# Patient Record
Sex: MALE | Race: WHITE
[De-identification: names, ages, dates, MRNs, and addresses within clinical notes are randomized per-mention and may not be internally consistent; named-entity substitution may affect disease eponyms.]

---

## 2019-09-23 ENCOUNTER — HOSPITAL ENCOUNTER (OUTPATIENT)
Dept: HOSPITAL 35 - CAT | Age: 64
End: 2019-09-23
Attending: FAMILY MEDICINE
Payer: COMMERCIAL

## 2019-09-23 DIAGNOSIS — I25.10: ICD-10-CM

## 2019-09-23 DIAGNOSIS — Z13.6: Primary | ICD-10-CM

## 2019-09-23 DIAGNOSIS — E78.00: ICD-10-CM

## 2019-09-26 ENCOUNTER — HOSPITAL ENCOUNTER (OUTPATIENT)
Dept: HOSPITAL 35 - CV | Age: 64
End: 2019-09-26
Attending: FAMILY MEDICINE
Payer: COMMERCIAL

## 2019-09-26 DIAGNOSIS — I65.23: Primary | ICD-10-CM

## 2019-09-26 DIAGNOSIS — I63.9: ICD-10-CM

## 2019-09-26 DIAGNOSIS — R42: ICD-10-CM

## 2019-10-03 NOTE — 24HR
[facility] [place]  [address]
Phone:  [phone]                    24 HR ELECTROCARDIOGRAM REPORT
_______________________________________________________________________________
 
Name:            JUAN MARTIN           Room #:                    REG [facility]           [id number]          Account #:     [id number]  
Admission:       19         Attend Phys:   Fabrizio Mckee
Discharge:                  Date of Birth: [birth year]  
                         Report #:      [id number]
        [id number]
_______________________________________________________________________________
THIS REPORT FOR:   //name//                          
 
                          [facility]
                                       
Test Date:    2019               Test Time:    11:42:00
Pat Name:     JUAN MARTIN             Department:   
Patient ID:   [id number]            Room:          
Gender:                                Technician:   
:          1955               Requested By: Fabrizio Larsen
Order Number: [id number]   Reading MD:   John Mcclellan
                           Interpretive Statements
The average heart rate was 79 bpm. There were no episodes of atrial
fibrillation or SVT. There were rare PVCs and PACs. There were no symptomatic
episodes during the monitoring. There was no clinically significant
bradycardia noted.
 
Electronically Signed On 10-3-2019 15:36:24 CDT by John Mcclellan
[email or web address]
 
 
 
 
 
 
 
 
 
 
 
 
 
 
 
 
 
 
 
 
 
 
 
 
 
 
  <ELECTRONICALLY SIGNED>
   By: John Mcclellan MD        
  10/03/19     1536
D: 19 1142                           _____________________________________
T: 19 1142                           MD NISHA Dennis

## 2019-11-25 ENCOUNTER — HOSPITAL ENCOUNTER (OUTPATIENT)
Dept: HOSPITAL 61 - PCVCIMAG | Age: 64
Discharge: HOME | End: 2019-11-25
Attending: INTERNAL MEDICINE
Payer: COMMERCIAL

## 2019-11-25 DIAGNOSIS — R07.9: ICD-10-CM

## 2019-11-25 DIAGNOSIS — R55: Primary | ICD-10-CM

## 2019-11-25 DIAGNOSIS — I10: ICD-10-CM

## 2019-11-25 DIAGNOSIS — E78.5: ICD-10-CM

## 2019-11-25 DIAGNOSIS — Z88.0: ICD-10-CM

## 2019-11-25 DIAGNOSIS — G45.9: ICD-10-CM

## 2019-11-25 PROCEDURE — 93325 DOPPLER ECHO COLOR FLOW MAPG: CPT

## 2019-11-25 PROCEDURE — 93351 STRESS TTE COMPLETE: CPT

## 2019-11-25 NOTE — PCVCIMAG
--------------- APPROVED REPORT --------------





Study performed:  11/25/2019 14:13:16



Exam:  Stress Echocardiogram

Indication: Chest pain, near syncope, htn, hlp, TIA

Patient Location: Echo lab

Stress Nurse: Adrienne Rey RN

Status: routine



Ht: 5 ft 11 in  

HR: 75 bpm      BP: 114/80 mmHg

Rhythm: NSR



Procedure

The patient underwent an Exercise Stress Test using the Vlad 

Protocol. Blood pressure, heart rate, and EKG were monitored.

An Echocardiogram was performed by technician in four stages in quad 

fashion.  At peak stress, four selected images were obtained and 

placed side by side with resting images for comparison.



Stress Test Details

Stress Test:  Exercise stress testing was performed using a Vlad 

protocol.

HR

Resting HR:            75 bpmMax Heart Rate (APMHR): 158 bpm 

Max HR Achieved:  155 bpmTarget HR (85% APMHR): 134 bpm

% of APMHR:         98

Recovery HR:            100 bpm

HR response to stress: Normal HR response to stress



BP

Resting BP:  114/80 mmHg

Max BP:       182/80 mmHg

Recovery BP:       172/68 mmHg

BP response to stress: Normal blood pressure response to 

stress.

ECG

Resting ECG:  Sinus Rhythm

Stress ECG:     Sinus Rhythm

ST Change: Normal

Arrhythmia:    rare PVC

Recovery ECG: Sinus Rhythm

Recovery ST Change: Normal

Recovery Arrhythmia: None



Clinical

Reason for Termination: Maximal effort

Stress Symptoms: Dyspnea

Exercise duration: 9 min 20 sec

Highest Stage Achieved: Stage 4: 4.2 mph at 16% grade. 

Exercise capacity: 11.2 METs

Overall Exercise Capacity for Age: Normal

Scale: Active

Angina Score: None



Stress ECG Conclusion

1. subjectively negative for ischemia

2. electrocardiographically negative for ischemia

3. satisfactory functional capacity



Pre-Stress Echo

The resting Echocardiogram showed normal left ventricular 

contractility with an estimated Ejection Fraction of about >55%. 

The resting echocardiogram demonstrated normal wall motion in all 

wall segments.  



Post-Stress Echo

The stress Echocardiogram showed normal left ventricular 

contractility with an estimated Ejection Fraction of about 65%. 

Compared to rest, there were no stress-induced wall motion 

abnormalities. 



Clinical

No clinical or ECG evidence for ischemia.



Conclusion

Clinical Response:  Non-ischemic

Exercise Capacity:  Average

Stress ECG Response:  Non-ischemic

Stress Echo Images:  Non-ischemic

The left ventricle is normal in size and wall thickness in both the 

rest and stress images.

Mild mitral regurgitation. Mild tricuspid regurgitation with PAP of 

38 mmHg. Normal aortic and pulmonic valves, no regurgitation or 

stenosis.



1. low risk study



Other Information

Study Quality: Adequate



<Conclusion>

The left ventricle is normal in size and wall thickness in both the 

rest and stress images.

Mild mitral regurgitation. Mild tricuspid regurgitation with PAP of 

38 mmHg. Normal aortic and pulmonic valves, no regurgitation or 

stenosis.



1. low risk study

## 2020-12-03 ENCOUNTER — HOSPITAL ENCOUNTER (OUTPATIENT)
Dept: HOSPITAL 35 - RAD | Age: 65
End: 2020-12-03
Attending: FAMILY MEDICINE
Payer: COMMERCIAL

## 2020-12-03 DIAGNOSIS — M25.851: ICD-10-CM

## 2020-12-03 DIAGNOSIS — M16.11: Primary | ICD-10-CM

## 2022-01-31 ENCOUNTER — HOSPITAL ENCOUNTER (OUTPATIENT)
Dept: HOSPITAL 35 - RAD | Age: 67
End: 2022-01-31
Attending: NURSE PRACTITIONER
Payer: COMMERCIAL

## 2022-01-31 DIAGNOSIS — M16.11: Primary | ICD-10-CM
